# Patient Record
(demographics unavailable — no encounter records)

---

## 2025-06-06 NOTE — PHYSICAL EXAM
[FreeTextEntry1] :  Mental status: Awake, alert and oriented x3.  Recent and remote memory intact.  Naming, repetition and comprehension intact.  Attention/concentration intact.  No dysarthria, no aphasia.  Fund of knowledge appropriate.  Cranial nerves: Pupils equally round and reactive to light, visual fields full, no nystagmus, extraocular muscles intact, V1 through V3 intact bilaterally and symmetric, face symmetric, hearing intact to finger rub, palate elevation symmetric, tongue was midline.  Motor:  MRC grading 5/5 b/l UE/LE.   strength 5/5.  Normal tone and bulk.  No abnormal movements.  Sensation: Intact to light touch, proprioception, and pinprick.  Coordination: No dysmetria on finger-to-nose and heel-to-shin.  No dysdiadokinesia.  Reflexes: 2+ in bilateral UE/LE, downgoing toes bilaterally. (-) Garcia.  Gait: Narrow and steady. No ataxia.  Romberg negative

## 2025-06-06 NOTE — HISTORY OF PRESENT ILLNESS
[FreeTextEntry1] : 52-year-old female past medical history of hyperlipidemia presents today after presenting to the hospital with dizziness. Patient had severe dizziness where the room was spinning associated with a headache and facial numbness and tingling. Had CTH and CTAs which were unremarkable. Patient now takes meclizine PRN for her vertigo symptoms but is still experiencing facial numbness as well as tongue numbness and tingling which can last for hours at a time and is not always associated with dizziness or a headache. She states her headaches are mild and not bothersome. No inciting or relieving factors. but does endorse she has a very stressful job. Denies fever, vision change, chest pain, shortness of breath, abdominal pain, dysuria, hematuria, vomiting, or diarrhea.

## 2025-06-06 NOTE — ASSESSMENT
[FreeTextEntry1] : 52-year-old female past medical history of hyperlipidemia presents today after presenting to the hospital with dizziness. was diagnosed with vertigo and on meclizine 25 mg PRN. Had CTH and CTAs which were unremarkable but is still experiencing facial numbness as well as tongue numbness and tingling which can last for hours at a time and is not always associated with dizziness or a headache. Due to her paresthesia symptoms, would like to order MRI head to r/o CVA  Plan: MRI head w/o F/U after MRI if positive